# Patient Record
Sex: MALE | Race: WHITE | NOT HISPANIC OR LATINO | Employment: UNEMPLOYED | URBAN - METROPOLITAN AREA
[De-identification: names, ages, dates, MRNs, and addresses within clinical notes are randomized per-mention and may not be internally consistent; named-entity substitution may affect disease eponyms.]

---

## 2019-10-24 ENCOUNTER — HOSPITAL ENCOUNTER (EMERGENCY)
Facility: HOSPITAL | Age: 39
Discharge: HOME | End: 2019-10-24
Attending: EMERGENCY MEDICINE | Admitting: EMERGENCY MEDICINE
Payer: COMMERCIAL

## 2019-10-24 ENCOUNTER — APPOINTMENT (EMERGENCY)
Dept: CT IMAGING | Facility: HOSPITAL | Age: 39
End: 2019-10-24
Payer: COMMERCIAL

## 2019-10-24 VITALS
DIASTOLIC BLOOD PRESSURE: 69 MMHG | BODY MASS INDEX: 25.01 KG/M2 | HEART RATE: 67 BPM | HEIGHT: 68 IN | OXYGEN SATURATION: 98 % | SYSTOLIC BLOOD PRESSURE: 119 MMHG | RESPIRATION RATE: 16 BRPM | TEMPERATURE: 97.5 F | WEIGHT: 165 LBS

## 2019-10-24 DIAGNOSIS — R51.9 HEADACHE: Primary | ICD-10-CM

## 2019-10-24 DIAGNOSIS — G43.909 MIGRAINE: ICD-10-CM

## 2019-10-24 LAB
ALBUMIN SERPL BCP-MCNC: 4 G/DL (ref 3.5–5)
ALP SERPL-CCNC: 67 U/L (ref 46–116)
ALT SERPL W P-5'-P-CCNC: 32 U/L (ref 12–78)
ANION GAP SERPL CALCULATED.3IONS-SCNC: 10 MMOL/L (ref 4–13)
AST SERPL W P-5'-P-CCNC: 6 U/L (ref 5–45)
BASOPHILS # BLD AUTO: 0.02 THOUSANDS/ΜL (ref 0–0.1)
BASOPHILS NFR BLD AUTO: 0 % (ref 0–1)
BILIRUB SERPL-MCNC: 0.3 MG/DL (ref 0.2–1)
BUN SERPL-MCNC: 24 MG/DL (ref 5–25)
CALCIUM SERPL-MCNC: 8.7 MG/DL (ref 8.3–10.1)
CHLORIDE SERPL-SCNC: 108 MMOL/L (ref 100–108)
CO2 SERPL-SCNC: 23 MMOL/L (ref 21–32)
CREAT SERPL-MCNC: 0.87 MG/DL (ref 0.6–1.3)
EOSINOPHIL # BLD AUTO: 0.08 THOUSAND/ΜL (ref 0–0.61)
EOSINOPHIL NFR BLD AUTO: 1 % (ref 0–6)
ERYTHROCYTE [DISTWIDTH] IN BLOOD BY AUTOMATED COUNT: 14 % (ref 11.6–15.1)
GFR SERPL CREATININE-BSD FRML MDRD: 109 ML/MIN/1.73SQ M
GLUCOSE SERPL-MCNC: 99 MG/DL (ref 65–140)
HCT VFR BLD AUTO: 40.8 % (ref 36.5–49.3)
HGB BLD-MCNC: 13.9 G/DL (ref 12–17)
IMM GRANULOCYTES # BLD AUTO: 0.04 THOUSAND/UL (ref 0–0.2)
IMM GRANULOCYTES NFR BLD AUTO: 1 % (ref 0–2)
LYMPHOCYTES # BLD AUTO: 1.83 THOUSANDS/ΜL (ref 0.6–4.47)
LYMPHOCYTES NFR BLD AUTO: 27 % (ref 14–44)
MCH RBC QN AUTO: 29.8 PG (ref 26.8–34.3)
MCHC RBC AUTO-ENTMCNC: 34.1 G/DL (ref 31.4–37.4)
MCV RBC AUTO: 88 FL (ref 82–98)
MONOCYTES # BLD AUTO: 0.72 THOUSAND/ΜL (ref 0.17–1.22)
MONOCYTES NFR BLD AUTO: 10 % (ref 4–12)
NEUTROPHILS # BLD AUTO: 4.21 THOUSANDS/ΜL (ref 1.85–7.62)
NEUTS SEG NFR BLD AUTO: 61 % (ref 43–75)
NRBC BLD AUTO-RTO: 0 /100 WBCS
PLATELET # BLD AUTO: 148 THOUSANDS/UL (ref 149–390)
PMV BLD AUTO: 11.1 FL (ref 8.9–12.7)
POTASSIUM SERPL-SCNC: 3.7 MMOL/L (ref 3.5–5.3)
PROT SERPL-MCNC: 6.8 G/DL (ref 6.4–8.2)
RBC # BLD AUTO: 4.66 MILLION/UL (ref 3.88–5.62)
SODIUM SERPL-SCNC: 141 MMOL/L (ref 136–145)
WBC # BLD AUTO: 6.9 THOUSAND/UL (ref 4.31–10.16)

## 2019-10-24 PROCEDURE — 96375 TX/PRO/DX INJ NEW DRUG ADDON: CPT

## 2019-10-24 PROCEDURE — 80053 COMPREHEN METABOLIC PANEL: CPT | Performed by: EMERGENCY MEDICINE

## 2019-10-24 PROCEDURE — 96361 HYDRATE IV INFUSION ADD-ON: CPT

## 2019-10-24 PROCEDURE — 70450 CT HEAD/BRAIN W/O DYE: CPT

## 2019-10-24 PROCEDURE — 99284 EMERGENCY DEPT VISIT MOD MDM: CPT | Performed by: EMERGENCY MEDICINE

## 2019-10-24 PROCEDURE — 99284 EMERGENCY DEPT VISIT MOD MDM: CPT

## 2019-10-24 PROCEDURE — 96372 THER/PROPH/DIAG INJ SC/IM: CPT

## 2019-10-24 PROCEDURE — 36415 COLL VENOUS BLD VENIPUNCTURE: CPT | Performed by: EMERGENCY MEDICINE

## 2019-10-24 PROCEDURE — 96376 TX/PRO/DX INJ SAME DRUG ADON: CPT

## 2019-10-24 PROCEDURE — 85025 COMPLETE CBC W/AUTO DIFF WBC: CPT | Performed by: EMERGENCY MEDICINE

## 2019-10-24 PROCEDURE — 96374 THER/PROPH/DIAG INJ IV PUSH: CPT

## 2019-10-24 RX ORDER — TOPIRAMATE 25 MG/1
25 CAPSULE, COATED PELLETS ORAL 2 TIMES DAILY
COMMUNITY

## 2019-10-24 RX ORDER — KETOROLAC TROMETHAMINE 30 MG/ML
30 INJECTION, SOLUTION INTRAMUSCULAR; INTRAVENOUS ONCE
Status: COMPLETED | OUTPATIENT
Start: 2019-10-24 | End: 2019-10-24

## 2019-10-24 RX ORDER — GABAPENTIN 300 MG/1
300 CAPSULE ORAL 3 TIMES DAILY
COMMUNITY

## 2019-10-24 RX ORDER — DEXAMETHASONE SODIUM PHOSPHATE 10 MG/ML
10 INJECTION, SOLUTION INTRAMUSCULAR; INTRAVENOUS ONCE
Status: COMPLETED | OUTPATIENT
Start: 2019-10-24 | End: 2019-10-24

## 2019-10-24 RX ORDER — DIPHENHYDRAMINE HYDROCHLORIDE 50 MG/ML
25 INJECTION INTRAMUSCULAR; INTRAVENOUS ONCE
Status: COMPLETED | OUTPATIENT
Start: 2019-10-24 | End: 2019-10-24

## 2019-10-24 RX ORDER — METOCLOPRAMIDE HYDROCHLORIDE 5 MG/ML
10 INJECTION INTRAMUSCULAR; INTRAVENOUS ONCE
Status: COMPLETED | OUTPATIENT
Start: 2019-10-24 | End: 2019-10-24

## 2019-10-24 RX ORDER — AMILORIDE HCL 5 MG
10 TABLET ORAL EVERY 4 HOURS PRN
COMMUNITY

## 2019-10-24 RX ADMIN — KETOROLAC TROMETHAMINE 30 MG: 30 INJECTION, SOLUTION INTRAMUSCULAR at 13:11

## 2019-10-24 RX ADMIN — DIPHENHYDRAMINE HYDROCHLORIDE 25 MG: 50 INJECTION, SOLUTION INTRAMUSCULAR; INTRAVENOUS at 11:27

## 2019-10-24 RX ADMIN — DIPHENHYDRAMINE HYDROCHLORIDE 25 MG: 50 INJECTION, SOLUTION INTRAMUSCULAR; INTRAVENOUS at 13:09

## 2019-10-24 RX ADMIN — SODIUM CHLORIDE 1000 ML: 0.9 INJECTION, SOLUTION INTRAVENOUS at 11:29

## 2019-10-24 RX ADMIN — METOCLOPRAMIDE 10 MG: 5 INJECTION, SOLUTION INTRAMUSCULAR; INTRAVENOUS at 11:28

## 2019-10-24 RX ADMIN — KETOROLAC TROMETHAMINE 30 MG: 30 INJECTION, SOLUTION INTRAMUSCULAR at 11:26

## 2019-10-24 RX ADMIN — METOCLOPRAMIDE 10 MG: 5 INJECTION, SOLUTION INTRAMUSCULAR; INTRAVENOUS at 13:10

## 2019-10-24 RX ADMIN — DEXAMETHASONE SODIUM PHOSPHATE 10 MG: 10 INJECTION, SOLUTION INTRAMUSCULAR; INTRAVENOUS at 13:08

## 2019-10-24 NOTE — ED PROVIDER NOTES
History  Chief Complaint   Patient presents with    Headache     To ED with c/o left sided headache for several days  Has a hx of head trauma with frequent headaches  Has photophobia, no nausea and vomiting  This is a 40-year-old male who presents via ambulance from [facility] for evaluation of left-sided headache associated with photophobia nausea vomiting over the past several days he does have a prior history of skull fracture many years ago and gets headaches 3 times a week although this headache is more intense than his normal headache it was gradual in onset he denies any fevers no recent injury      History provided by:  Patient and EMS personnel  Headache   Pain location:  L temporal  Quality:  Sharp  Radiates to:  Does not radiate  Onset quality:  Gradual  Duration:  3 days  Timing:  Constant  Progression:  Unchanged  Chronicity:  Chronic  Similar to prior headaches: yes (More intense but similar)    Context: activity and bright light    Relieved by:  Nothing  Worsened by: Activity, light and sound  Associated symptoms: nausea and vomiting        Prior to Admission Medications   Prescriptions Last Dose Informant Patient Reported? Taking?   gabapentin (NEURONTIN) 300 mg capsule  Self Yes Yes   Sig: Take 300 mg by mouth 3 (three) times a day   nystatin (MYCOSTATIN) 500,000 units/5 mL suspension  Self Yes Yes   Sig: Apply 500,000 Units to the mouth or throat 4 (four) times a day   phenylephrine (SUDAFED PE) 10 MG TABS  Self Yes Yes   Sig: Take 10 mg by mouth every 4 (four) hours as needed for congestion   topiramate (TOPAMAX) 25 mg sprinkle capsule  Self Yes Yes   Sig: Take 25 mg by mouth 2 (two) times a day      Facility-Administered Medications: None       Past Medical History:   Diagnosis Date    Alcohol abuse     Drug abuse ([facility] [address] )     Head injury        History reviewed  No pertinent surgical history  History reviewed  No pertinent family history    I have reviewed and agree with the history as documented  Social History     Tobacco Use    Smoking status: Current Every Day Smoker     Packs/day: 1 00     Types: Cigarettes    Smokeless tobacco: Never Used   Substance Use Topics    Alcohol use: Not Currently     Comment: FORMER    Drug use: Not Currently        Review of Systems   Gastrointestinal: Positive for nausea and vomiting  Neurological: Positive for headaches  All other systems reviewed and are negative  Physical Exam  Physical Exam   Constitutional: He is oriented to person, place, and time  He appears well-developed and well-nourished  No distress  HENT:   Head: Normocephalic and atraumatic  Right Ear: External ear normal    Left Ear: External ear normal    Nose: Nose normal    Mouth/Throat: Oropharynx is clear and moist    Eyes: Pupils are equal, round, and reactive to light  Right eye exhibits no discharge  Left eye exhibits no discharge  No scleral icterus  Neck: Normal range of motion  Neck supple  No JVD present  No tracheal deviation present  Kernig and Brudzinski sign negative   Cardiovascular: Normal rate, regular rhythm and intact distal pulses  Exam reveals no gallop and no friction rub  No murmur heard  Pulmonary/Chest: Effort normal and breath sounds normal  No stridor  No respiratory distress  He has no wheezes  He has no rales  Abdominal: Soft  Bowel sounds are normal  He exhibits no distension  There is no tenderness  There is no guarding  Musculoskeletal: Normal range of motion  He exhibits no edema, tenderness or deformity  Neurological: He is alert and oriented to person, place, and time  No cranial nerve deficit  Skin: Skin is warm and dry  No rash noted  He is not diaphoretic  Psychiatric: He has a normal mood and affect  His behavior is normal  Thought content normal    Nursing note and vitals reviewed        Vital Signs  ED Triage Vitals   Temperature Pulse Respirations Blood Pressure SpO2   10/24/19 1100 10/24/19 1100 10/24/19 1100 10/24/19 1100 10/24/19 1100   97 5 °F (36 4 °C) 75 20 130/82 100 %      Temp Source Heart Rate Source Patient Position - Orthostatic VS BP Location FiO2 (%)   10/24/19 1100 10/24/19 1100 10/24/19 1100 10/24/19 1100 --   Tympanic Monitor Sitting Right arm       Pain Score       10/24/19 1056       9           Vitals:    10/24/19 1100   BP: 130/82   Pulse: 75   Patient Position - Orthostatic VS: Sitting         Visual Acuity  Visual Acuity      Most Recent Value   L Pupil Size (mm)  3   R Pupil Size (mm)  3          ED Medications  Medications   ketorolac (TORADOL) injection 30 mg (has no administration in time range)   metoclopramide (REGLAN) injection 10 mg (has no administration in time range)   diphenhydrAMINE (BENADRYL) injection 25 mg (has no administration in time range)   dexamethasone (PF) (DECADRON) injection 10 mg (has no administration in time range)   sodium chloride 0 9 % bolus 1,000 mL (1,000 mL Intravenous New Bag 10/24/19 1129)   ketorolac (TORADOL) injection 30 mg (30 mg Intravenous Given 10/24/19 1126)   metoclopramide (REGLAN) injection 10 mg (10 mg Intravenous Given 10/24/19 1128)   diphenhydrAMINE (BENADRYL) injection 25 mg (25 mg Intravenous Given 10/24/19 1127)       Diagnostic Studies  Results Reviewed     Procedure Component Value Units Date/Time    Comprehensive metabolic panel [[id number]] Collected:  10/24/19 1124    Lab Status:  Final result Specimen:  Blood from Arm, Left Updated:  10/24/19 1214     Sodium 141 mmol/L      Potassium 3 7 mmol/L      Chloride 108 mmol/L      CO2 23 mmol/L      ANION GAP 10 mmol/L      BUN 24 mg/dL      Creatinine 0 87 mg/dL      Glucose 99 mg/dL      Calcium 8 7 mg/dL      AST 6 U/L      ALT 32 U/L      Alkaline Phosphatase 67 U/L      Total Protein 6 8 g/dL      Albumin 4 0 g/dL      Total Bilirubin 0 30 mg/dL      eGFR 109 ml/min/1 73sq m     Narrative:       [place] guidelines for Chronic Kidney Disease (CKD):     Stage 1 with normal or high GFR (GFR > 90 mL/min/1 73 square meters)    Stage 2 Mild CKD (GFR = 60-89 mL/min/1 73 square meters)    Stage 3A Moderate CKD (GFR = 45-59 mL/min/1 73 square meters)    Stage 3B Moderate CKD (GFR = 30-44 mL/min/1 73 square meters)    Stage 4 Severe CKD (GFR = 15-29 mL/min/1 73 square meters)    Stage 5 End Stage CKD (GFR <15 mL/min/1 73 square meters)  Note: GFR calculation is accurate only with a steady state creatinine    CBC and differential [[id number]]  (Abnormal) Collected:  10/24/19 1124    Lab Status:  Final result Specimen:  Blood from Arm, Left Updated:  10/24/19 1157     WBC 6 90 Thousand/uL      RBC 4 66 Million/uL      Hemoglobin 13 9 g/dL      Hematocrit 40 8 %      MCV 88 fL      MCH 29 8 pg      MCHC 34 1 g/dL      RDW 14 0 %      MPV 11 1 fL      Platelets 798 Thousands/uL      nRBC 0 /100 WBCs      Neutrophils Relative 61 %      Immat GRANS % 1 %      Lymphocytes Relative 27 %      Monocytes Relative 10 %      Eosinophils Relative 1 %      Basophils Relative 0 %      Neutrophils Absolute 4 21 Thousands/µL      Immature Grans Absolute 0 04 Thousand/uL      Lymphocytes Absolute 1 83 Thousands/µL      Monocytes Absolute 0 72 Thousand/µL      Eosinophils Absolute 0 08 Thousand/µL      Basophils Absolute 0 02 Thousands/µL                  CT head without contrast   Final Result by Luis Lancaster MD (10/24 7617)      No acute intracranial abnormality                    Workstation performed: [id number]                    Procedures  Procedures       ED Course  ED Course as of Oct 24 1300   Thu Oct 24, 2019   1257 Feeling a little bit better resting comfortably in no acute distress will give in additional round of medication and discharge                                  MDM  Number of Diagnoses or Management Options  Diagnosis management comments: Left-sided headache most consistent with migraine will treat symptomatically check labs and CT scan       Amount and/or Complexity of Data Reviewed  Clinical lab tests: ordered  Tests in the radiology section of CPT®: ordered        Disposition  Final diagnoses:   Headache   Migraine     Time reflects when diagnosis was documented in both MDM as applicable and the Disposition within this note     Time User Action Codes Description Comment    10/24/2019  1:00 PM Idris Ruby [R51] Headache     10/24/2019  1:00 PM Idris Ruby [[id number]] Migraine       ED Disposition     ED Disposition Condition Date/Time Comment    Discharge Stable Thu Oct 24, 2019 12:59 PM Tim Woodward discharge to home/self care  Follow-up Information     Follow up With Specialties Details Why Contact Info Additional Information    Infolink  In 1 week Primary care physician for follow-up [facility] Emergency Department Emergency Medicine  As needed, If symptoms worsen [address] [place] [address] [place] [facility], [facility] [place], [place], [address]          Patient's Medications   Discharge Prescriptions    No medications on file     No discharge procedures on file      ED Provider  Electronically Signed by           Riccardo James DO  10/24/19 1300

## 2019-11-02 ENCOUNTER — HOSPITAL ENCOUNTER (EMERGENCY)
Facility: HOSPITAL | Age: 39
Discharge: HOME | End: 2019-11-02
Attending: EMERGENCY MEDICINE | Admitting: EMERGENCY MEDICINE
Payer: MEDICARE

## 2019-11-02 VITALS
HEIGHT: 68 IN | TEMPERATURE: 97.2 F | BODY MASS INDEX: 25.13 KG/M2 | OXYGEN SATURATION: 100 % | WEIGHT: 165.79 LBS | RESPIRATION RATE: 17 BRPM | HEART RATE: 62 BPM | SYSTOLIC BLOOD PRESSURE: 138 MMHG | DIASTOLIC BLOOD PRESSURE: 79 MMHG

## 2019-11-02 DIAGNOSIS — R51.9 HEADACHE: Primary | ICD-10-CM

## 2019-11-02 PROCEDURE — 96365 THER/PROPH/DIAG IV INF INIT: CPT

## 2019-11-02 PROCEDURE — 99283 EMERGENCY DEPT VISIT LOW MDM: CPT

## 2019-11-02 PROCEDURE — 99284 EMERGENCY DEPT VISIT MOD MDM: CPT | Performed by: EMERGENCY MEDICINE

## 2019-11-02 PROCEDURE — 96375 TX/PRO/DX INJ NEW DRUG ADDON: CPT

## 2019-11-02 RX ORDER — METOCLOPRAMIDE HYDROCHLORIDE 5 MG/ML
10 INJECTION INTRAMUSCULAR; INTRAVENOUS ONCE
Status: COMPLETED | OUTPATIENT
Start: 2019-11-02 | End: 2019-11-02

## 2019-11-02 RX ORDER — DIPHENHYDRAMINE HYDROCHLORIDE 50 MG/ML
25 INJECTION INTRAMUSCULAR; INTRAVENOUS ONCE
Status: COMPLETED | OUTPATIENT
Start: 2019-11-02 | End: 2019-11-02

## 2019-11-02 RX ORDER — MAGNESIUM SULFATE 1 G/100ML
1 INJECTION INTRAVENOUS ONCE
Status: COMPLETED | OUTPATIENT
Start: 2019-11-02 | End: 2019-11-02

## 2019-11-02 RX ORDER — KETOROLAC TROMETHAMINE 30 MG/ML
15 INJECTION, SOLUTION INTRAMUSCULAR; INTRAVENOUS ONCE
Status: COMPLETED | OUTPATIENT
Start: 2019-11-02 | End: 2019-11-02

## 2019-11-02 RX ORDER — DEXAMETHASONE SODIUM PHOSPHATE 10 MG/ML
10 INJECTION, SOLUTION INTRAMUSCULAR; INTRAVENOUS ONCE
Status: COMPLETED | OUTPATIENT
Start: 2019-11-02 | End: 2019-11-02

## 2019-11-02 RX ADMIN — METOCLOPRAMIDE 10 MG: 5 INJECTION, SOLUTION INTRAMUSCULAR; INTRAVENOUS at 16:19

## 2019-11-02 RX ADMIN — MAGNESIUM SULFATE HEPTAHYDRATE 1 G: 1 INJECTION, SOLUTION INTRAVENOUS at 16:19

## 2019-11-02 RX ADMIN — DEXAMETHASONE SODIUM PHOSPHATE 10 MG: 10 INJECTION, SOLUTION INTRAMUSCULAR; INTRAVENOUS at 16:19

## 2019-11-02 RX ADMIN — DIPHENHYDRAMINE HYDROCHLORIDE 25 MG: 50 INJECTION, SOLUTION INTRAMUSCULAR; INTRAVENOUS at 16:19

## 2019-11-02 RX ADMIN — KETOROLAC TROMETHAMINE 15 MG: 30 INJECTION, SOLUTION INTRAMUSCULAR at 16:18

## 2019-11-06 NOTE — ED PROVIDER NOTES
History  Chief Complaint   Patient presents with    Headache     Pt states that he has been having a headache since he was 21 when he fractured his skull  He has been at [facility] for the past month  He was self medicating before going to [facility] with ETOH and Kratum  HPI     72-year-old male presents for headache  The patient reports that he has had a headache for the past 18 years  He is currently appear event for the past month detoxing from alcohol and Kratom  States it all started when he had a skull fracture when he was 21  He reports a headache off and on since then  This headache has been going on for the past few days  Was recently seen for similar headache  No fevers chills visual changes or red flags  Normal neurologic exam otherwise  Assessment plan:  Headache likely tension versus migraine versus cluster  He does have some pain behind his eye  There is a normal funduscopic exam   Start with oxygen, migraine cocktail discharge follow up with Neurology    Prior to Admission Medications   Prescriptions Last Dose Informant Patient Reported? Taking?   gabapentin (NEURONTIN) 300 mg capsule  Self Yes No   Sig: Take 300 mg by mouth 3 (three) times a day   nystatin (MYCOSTATIN) 500,000 units/5 mL suspension  Self Yes No   Sig: Apply 500,000 Units to the mouth or throat 4 (four) times a day   phenylephrine (SUDAFED PE) 10 MG TABS  Self Yes No   Sig: Take 10 mg by mouth every 4 (four) hours as needed for congestion   topiramate (TOPAMAX) 25 mg sprinkle capsule  Self Yes No   Sig: Take 25 mg by mouth 2 (two) times a day      Facility-Administered Medications: None       Past Medical History:   Diagnosis Date    Alcohol abuse     Drug abuse ([facility] [address] )     Head injury     Psychiatric disorder     bipolar       History reviewed  No pertinent surgical history  History reviewed  No pertinent family history  I have reviewed and agree with the history as documented      Social History     Tobacco Use    Smoking status: Current Every Day Smoker     Packs/day: 1 00     Types: Cigarettes    Smokeless tobacco: Never Used   Substance Use Topics    Alcohol use: Not Currently     Comment: FORMER    Drug use: Not Currently        Review of Systems   Constitutional: Negative for chills, fatigue and fever  Eyes: Negative for photophobia and visual disturbance  Respiratory: Negative for cough and shortness of breath  Cardiovascular: Negative for chest pain, palpitations and leg swelling  Gastrointestinal: Negative for diarrhea, nausea and vomiting  Endocrine: Negative for polydipsia and polyuria  Genitourinary: Negative for decreased urine volume, difficulty urinating, dysuria and frequency  Musculoskeletal: Negative for back pain, neck pain and neck stiffness  Skin: Negative for color change and rash  Allergic/Immunologic: Negative for environmental allergies and immunocompromised state  Neurological: Negative for dizziness and headaches  Hematological: Negative for adenopathy  Does not bruise/bleed easily  Psychiatric/Behavioral: Negative for dysphoric mood  The patient is not nervous/anxious  Physical Exam  Physical Exam   Constitutional: He is oriented to person, place, and time  He appears well-developed  HENT:   Head: Normocephalic and atraumatic  Right Ear: External ear normal    Left Ear: External ear normal    Mouth/Throat: Oropharynx is clear and moist    Eyes: Pupils are equal, round, and reactive to light  Conjunctivae and EOM are normal    Neck: Normal range of motion  Neck supple  No JVD present  No thyromegaly present  Cardiovascular: Normal rate, regular rhythm and normal heart sounds  Exam reveals no gallop and no friction rub  No murmur heard  Pulmonary/Chest: Effort normal and breath sounds normal  No respiratory distress  He has no wheezes  He has no rales  Abdominal: Soft  Bowel sounds are normal  He exhibits no distension  There is no rebound and no guarding  Musculoskeletal: Normal range of motion  He exhibits no edema  Lymphadenopathy:     He has no cervical adenopathy  Neurological: He is alert and oriented to person, place, and time  No cranial nerve deficit  Skin: Skin is warm  Psychiatric: He has a normal mood and affect  His behavior is normal    Nursing note and vitals reviewed        Vital Signs  ED Triage Vitals [11/02/19 1602]   Temperature Pulse Respirations Blood Pressure SpO2   (!) 97 2 °F (36 2 °C) 84 18 142/93 99 %      Temp Source Heart Rate Source Patient Position - Orthostatic VS BP Location FiO2 (%)   Temporal -- Sitting Right arm --      Pain Score       8           Vitals:    11/02/19 1700 11/02/19 1715 11/02/19 1730 11/02/19 1745   BP: 125/76 117/65 121/71 138/79   Pulse: 64 65 66 62   Patient Position - Orthostatic VS:             Visual Acuity      ED Medications  Medications   ketorolac (TORADOL) injection 15 mg (15 mg Intravenous Given 11/2/19 1618)   dexamethasone (PF) (DECADRON) injection 10 mg (10 mg Intravenous Given 11/2/19 1619)   magnesium sulfate IVPB (premix) SOLN 1 g (0 g Intravenous Stopped 11/2/19 1649)   metoclopramide (REGLAN) injection 10 mg (10 mg Intravenous Given 11/2/19 1619)   diphenhydrAMINE (BENADRYL) injection 25 mg (25 mg Intravenous Given 11/2/19 1619)       Diagnostic Studies  Results Reviewed     None                 No orders to display              Procedures  Procedures       ED Course                               MDM  Number of Diagnoses or Management Options  Headache: new and requires workup      Disposition  Final diagnoses:   Headache     Time reflects when diagnosis was documented in both MDM as applicable and the Disposition within this note     Time User Action Codes Description Comment    11/2/2019  6:22 PM Eddy Johnson [address] [place] [address] Headache       ED Disposition     ED Disposition Condition Date/Time Comment    Discharge Stable Sat Nov 2, 2019  6:22 PM Godwin Diaz discharge to home/self care             Follow-up Information     Follow up With Specialties Details Why Jovany Camarillo MD Neurology   [address]  [phone]            Discharge Medication List as of 11/2/2019  6:28 PM      CONTINUE these medications which have NOT CHANGED    Details   gabapentin (NEURONTIN) 300 mg capsule Take 300 mg by mouth 3 (three) times a day, Historical Med      nystatin (MYCOSTATIN) 500,000 units/5 mL suspension Apply 500,000 Units to the mouth or throat 4 (four) times a day, Historical Med      phenylephrine (SUDAFED PE) 10 MG TABS Take 10 mg by mouth every 4 (four) hours as needed for congestion, Historical Med      topiramate (TOPAMAX) 25 mg sprinkle capsule Take 25 mg by mouth 2 (two) times a day, Historical Med           No discharge procedures on file      ED Provider  Electronically Signed by           Ermias Jones DO  11/06/19 8505 no

## 2020-02-10 ENCOUNTER — HOSPITAL ENCOUNTER (EMERGENCY)
Facility: HOSPITAL | Age: 40
Discharge: HOME | End: 2020-02-11
Attending: EMERGENCY MEDICINE | Admitting: EMERGENCY MEDICINE
Payer: MEDICARE

## 2020-02-10 DIAGNOSIS — F19.10 SUBSTANCE ABUSE (HCC): ICD-10-CM

## 2020-02-10 DIAGNOSIS — R45.851 SUICIDAL IDEATION: Primary | ICD-10-CM

## 2020-02-10 LAB
ALBUMIN SERPL BCP-MCNC: 3.7 G/DL (ref 3.5–5)
ALP SERPL-CCNC: 60 U/L (ref 46–116)
ALT SERPL W P-5'-P-CCNC: 18 U/L (ref 12–78)
AMPHETAMINES SERPL QL SCN: NEGATIVE
ANION GAP SERPL CALCULATED.3IONS-SCNC: 4 MMOL/L (ref 4–13)
AST SERPL W P-5'-P-CCNC: 7 U/L (ref 5–45)
BARBITURATES UR QL: NEGATIVE
BASOPHILS # BLD AUTO: 0.02 THOUSANDS/ΜL (ref 0–0.1)
BASOPHILS NFR BLD AUTO: 0 % (ref 0–1)
BENZODIAZ UR QL: NEGATIVE
BILIRUB SERPL-MCNC: 0.23 MG/DL (ref 0.2–1)
BUN SERPL-MCNC: 17 MG/DL (ref 5–25)
CALCIUM SERPL-MCNC: 8.6 MG/DL (ref 8.3–10.1)
CHLORIDE SERPL-SCNC: 108 MMOL/L (ref 100–108)
CO2 SERPL-SCNC: 29 MMOL/L (ref 21–32)
COCAINE UR QL: POSITIVE
CREAT SERPL-MCNC: 0.98 MG/DL (ref 0.6–1.3)
EOSINOPHIL # BLD AUTO: 0.19 THOUSAND/ΜL (ref 0–0.61)
EOSINOPHIL NFR BLD AUTO: 2 % (ref 0–6)
ERYTHROCYTE [DISTWIDTH] IN BLOOD BY AUTOMATED COUNT: 12.6 % (ref 11.6–15.1)
ETHANOL EXG-MCNC: 0 MG/DL
GFR SERPL CREATININE-BSD FRML MDRD: 97 ML/MIN/1.73SQ M
GLUCOSE SERPL-MCNC: 114 MG/DL (ref 65–140)
HCT VFR BLD AUTO: 39.9 % (ref 36.5–49.3)
HGB BLD-MCNC: 13.6 G/DL (ref 12–17)
IMM GRANULOCYTES # BLD AUTO: 0.01 THOUSAND/UL (ref 0–0.2)
IMM GRANULOCYTES NFR BLD AUTO: 0 % (ref 0–2)
LYMPHOCYTES # BLD AUTO: 2.74 THOUSANDS/ΜL (ref 0.6–4.47)
LYMPHOCYTES NFR BLD AUTO: 30 % (ref 14–44)
MCH RBC QN AUTO: 30.2 PG (ref 26.8–34.3)
MCHC RBC AUTO-ENTMCNC: 34.1 G/DL (ref 31.4–37.4)
MCV RBC AUTO: 89 FL (ref 82–98)
METHADONE UR QL: NEGATIVE
MONOCYTES # BLD AUTO: 0.67 THOUSAND/ΜL (ref 0.17–1.22)
MONOCYTES NFR BLD AUTO: 7 % (ref 4–12)
NEUTROPHILS # BLD AUTO: 5.43 THOUSANDS/ΜL (ref 1.85–7.62)
NEUTS SEG NFR BLD AUTO: 61 % (ref 43–75)
NRBC BLD AUTO-RTO: 0 /100 WBCS
OPIATES UR QL SCN: NEGATIVE
PCP UR QL: NEGATIVE
PLATELET # BLD AUTO: 203 THOUSANDS/UL (ref 149–390)
PMV BLD AUTO: 11.5 FL (ref 8.9–12.7)
POTASSIUM SERPL-SCNC: 4.1 MMOL/L (ref 3.5–5.3)
PROT SERPL-MCNC: 6.6 G/DL (ref 6.4–8.2)
RBC # BLD AUTO: 4.5 MILLION/UL (ref 3.88–5.62)
SODIUM SERPL-SCNC: 141 MMOL/L (ref 136–145)
THC UR QL: NEGATIVE
WBC # BLD AUTO: 9.06 THOUSAND/UL (ref 4.31–10.16)

## 2020-02-10 PROCEDURE — 80307 DRUG TEST PRSMV CHEM ANLYZR: CPT | Performed by: EMERGENCY MEDICINE

## 2020-02-10 PROCEDURE — 85025 COMPLETE CBC W/AUTO DIFF WBC: CPT | Performed by: EMERGENCY MEDICINE

## 2020-02-10 PROCEDURE — 99284 EMERGENCY DEPT VISIT MOD MDM: CPT

## 2020-02-10 PROCEDURE — 82075 ASSAY OF BREATH ETHANOL: CPT | Performed by: EMERGENCY MEDICINE

## 2020-02-10 PROCEDURE — 99285 EMERGENCY DEPT VISIT HI MDM: CPT | Performed by: EMERGENCY MEDICINE

## 2020-02-10 PROCEDURE — 80053 COMPREHEN METABOLIC PANEL: CPT | Performed by: EMERGENCY MEDICINE

## 2020-02-10 PROCEDURE — 36415 COLL VENOUS BLD VENIPUNCTURE: CPT | Performed by: EMERGENCY MEDICINE

## 2020-02-10 NOTE — ED NOTES
Dual bed search to following facilities:    [facility]: No beds available  [facility]: Spoke with Adalid Jin, no beds available tonight- but able to look for tomorrow if needed  [facility]: No answer in admissions department x2  [facility]: Spoke with Diana Hung, possible bed if another referral doesn't work out; chart faxed for review  [facility]: Spoke with A[facility], no beds available  [facility]: Spoke with [facility], possible bed availability; chart faxed for review       CORNELIO Beatty  02/10/20   2577

## 2020-02-10 NOTE — ED NOTES
Insurance Authorization for admission:   Phone call placed to [facility]  Phone number: [phone]  Spoke to Arturo PORTILLO      2 days approved  Level of care: Acute Inpt MH (201)  Review on TBD  Authorization # To be obtained by accepting facility upon arrival         EVS (Eligibility Verification System) called - [phone]  Automated system indicates: Active with [facility] for Transportation: To be completed, if needed, once transportation arranged       CORNELIO Thakur  02/10/20   1562

## 2020-02-10 NOTE — LETTER
[facility] EMERGENCY DEPARTMENT  [address] [place] [address]  Dept: [phone]      [facility] TRANSFER CONSENT    NAME Karena SPENCER 1980                              MRN [id number]    I have been informed of my rights regarding examination, treatment, and transfer   by Dr Steffany Gann MD    Benefits: Specialized equipment and/or services available at the receiving facility (Include comment)________________________(inpatient psychiatric stabilization)    Risks:        { ED EMTALA TRANSFER CHOICES:[id number]}    I authorize the performance of emergency medical procedures and treatments upon me in both transit and upon arrival at the receiving facility  Additionally, I authorize the release of any and all medical records to the receiving facility and request they be transported with me, if possible  I understand that the safest mode of transportation during a medical emergency is an ambulance and that the Hospital advocates the use of this mode of transport  Risks of traveling to the receiving facility by car, including absence of medical control, life sustaining equipment, such as oxygen, and medical personnel has been explained to me and I fully understand them  (NASIR CORRECT BOX BELOW)  [  ]  I consent to the stated transfer and to be transported by ambulance/helicopter  [  ]  I consent to the stated transfer, but refuse transportation by ambulance and accept full responsibility for my transportation by car    I understand the risks of non-ambulance transfers and I exonerate the Hospital and its staff from any deterioration in my condition that results from this refusal     X___________________________________________    DATE  20  TIME________  Signature of patient or legally responsible individual signing on patient behalf           RELATIONSHIP TO PATIENT_________________________          Provider Certification    NAME Tim Woodward                                         1980                              MRN [id number]    A medical screening exam was performed on the above named patient  Based on the examination:    Condition Necessitating Transfer The primary encounter diagnosis was Suicidal ideation  A diagnosis of Substance abuse ([facility] [address] ) was also pertinent to this visit  Patient Condition:      Reason for Transfer: Level of Care needed not available at this facility(inpatient psychiatric stabilization)    Transfer Requirements: [facility]   · Space available and qualified personnel available for treatment as acknowledged by Magdy Smith [phone]  · Agreed to accept transfer and to provide appropriate medical treatment as acknowledged by       Dr Zaira Quiros   · Appropriate medical records of the examination and treatment of the patient are provided at the time of transfer   [facility] [place] [address] _______  · Transfer will be performed by qualified personnel from [facility] with CTS  and appropriate transfer equipment as required, including the use of necessary and appropriate life support measures      Provider Certification: I have examined the patient and explained the following risks and benefits of being transferred/refusing transfer to the patient/family:  The patient is stable for psychiatric transfer because they are medically stable, and is protected from harming him/herself or others during transport      Based on these reasonable risks and benefits to the patient and/or the unborn child(stefan), and based upon the information available at the time of the patients examination, I certify that the medical benefits reasonably to be expected from the provision of appropriate medical treatments at another medical facility outweigh the increasing risks, if any, to the individuals medical condition, and in the case of labor to the unborn child, from effecting the transfer      X____________________________________________ DATE 02/11/20        TIME_______      ORIGINAL - SEND TO MEDICAL RECORDS   COPY - SEND WITH PATIENT DURING TRANSFER

## 2020-02-10 NOTE — ED NOTES
Pt is a 44 y o  male who was brought to the ED due to suicidal ideations and worsening depression  Patient states that since yesterday he has been having suicidal ideations with a plan to hang himself  Patient denies prior suicide attempts or prior suicidal ideations  Patient states that over the past week he has lost his job and his house  He has been staying in a hotel and relates that between that and cocaine use, he has used all his money  Patient states that his biggest trigger has been not having Suboxone for 4 days  Patient relates that he was prescribed suboxone while living in [place], however since moving out here he has been getting it off the street  Patient reports moving to the area around 7-8 months ago because his child lives around here  Patient denies supports outside of a friend in [place]  Patient states that he was in [facility] until September 2019 and as part of his probation he went to rehab at [facility] around December 2019  Patient denies any inpatient/outpatient treatment since  He relates that because he did not have opioids in his system, he was not prescribed suboxone and was never linked with an outpatient provider to begin to receive it again  Patient denies any prior mental health treatment, but relates that while he was in rehab, he was diagnosed with bipolar disorder  He expressed being prescribed medications, but when his depression became worse they were stopped and he has not had medications since  Patient denies homicidal ideations and auditory/visual hallucinations  Patient reports restless sleep and decreased appetite over the past week  Treatment options were discussed with patient who remains preoccupied with being prescribed suboxone  Patient states that the only reason he is more depressed is because he doesn't have his suboxone  After discussing further with patient, he is agreeable to inpatient dual treatment and signed a 201      Chief Complaint   Patient presents with    Psychiatric Evaluation     Pt reports increasing depression and Suicide attempt by hanging yesterday   Denies HI, AH, or VH     Intake Assessment completed, Safety risk Assessment completed    CORNELIO Harris  02/10/20   9929

## 2020-02-10 NOTE — ED NOTES
Pt belongings include back pack (next to sink under shelf D), cell phone, headphones, pants, shirt, jacket, sock, boots   Placed in [facility] utility room shelf D      Jasmyne Watts  02/10/20 0078

## 2020-02-11 VITALS
DIASTOLIC BLOOD PRESSURE: 63 MMHG | HEART RATE: 62 BPM | TEMPERATURE: 99 F | RESPIRATION RATE: 18 BRPM | WEIGHT: 160 LBS | SYSTOLIC BLOOD PRESSURE: 101 MMHG | OXYGEN SATURATION: 97 % | BODY MASS INDEX: 24.33 KG/M2

## 2020-02-11 NOTE — ED PROVIDER NOTES
History  Chief Complaint   Patient presents with    Psychiatric Evaluation     Pt reports increasing depression and Suicide attempt by hanging yesterday  Denies HI, AH, or VH     Patient 43-year-old past history of opioid use previously on Suboxone, cocaine use presenting today endorsing SI  No HI, AH, VH  He denies any history of psychiatric placement related to depression  Patient says that he wants to hang himself last night  He feels he is really depressed because he lost his house and he lost his job  Patient recently moved from [place] to the area  He says he has not established psychiatric care appear  He says he was on Suboxone down there  Patient denies any chest pain, shortness of breath, nausea, vomiting, diarrhea  Patient does not have any medical complaints  History provided by:  Patient   used: No    Psychiatric Evaluation   Presenting symptoms: suicidal thoughts    Associated symptoms: no abdominal pain and no chest pain        Prior to Admission Medications   Prescriptions Last Dose Informant Patient Reported? Taking?   gabapentin (NEURONTIN) 300 mg capsule  Self Yes No   Sig: Take 300 mg by mouth 3 (three) times a day   nystatin (MYCOSTATIN) 500,000 units/5 mL suspension  Self Yes No   Sig: Apply 500,000 Units to the mouth or throat 4 (four) times a day   phenylephrine (SUDAFED PE) 10 MG TABS  Self Yes No   Sig: Take 10 mg by mouth every 4 (four) hours as needed for congestion   topiramate (TOPAMAX) 25 mg sprinkle capsule  Self Yes No   Sig: Take 25 mg by mouth 2 (two) times a day      Facility-Administered Medications: None       Past Medical History:   Diagnosis Date    Alcohol abuse     Drug abuse ([facility] [address] )     Head injury     Psychiatric disorder     bipolar       History reviewed  No pertinent surgical history  History reviewed  No pertinent family history  I have reviewed and agree with the history as documented      Social History     Tobacco Use  Smoking status: Current Every Day Smoker     Packs/day: 1 00     Types: Cigarettes    Smokeless tobacco: Never Used   Substance Use Topics    Alcohol use: Not Currently     Comment: FORMER    Drug use: Yes     Types: Cocaine     Comment: suboxone         Review of Systems   Constitutional: Negative for chills, diaphoresis and fever  HENT: Negative  Eyes: Negative  Negative for visual disturbance  Respiratory: Negative  Negative for shortness of breath  Cardiovascular: Negative  Negative for chest pain  Gastrointestinal: Negative  Negative for abdominal pain, nausea and vomiting  Endocrine: Negative  Genitourinary: Negative  Musculoskeletal: Negative  Negative for myalgias  Skin: Negative  Negative for rash  Allergic/Immunologic: Negative  Neurological: Negative  Negative for light-headedness and numbness  Hematological: Negative  Psychiatric/Behavioral: Positive for suicidal ideas  Negative for dysphoric mood  All other systems reviewed and are negative  Physical Exam  ED Triage Vitals [02/10/20 1645]   Temperature Pulse Respirations Blood Pressure SpO2   99 °F (37 2 °C) 77 16 146/79 100 %      Temp Source Heart Rate Source Patient Position - Orthostatic VS BP Location FiO2 (%)   Tympanic Monitor Sitting Left arm --      Pain Score       No Pain             Orthostatic Vital Signs  Vitals:    02/10/20 1645 02/10/20 2229 02/11/20 0213   BP: 146/79 98/57 101/63   Pulse: 77 68 62   Patient Position - Orthostatic VS: Sitting  Lying       Physical Exam   Constitutional: He is oriented to person, place, and time  He appears well-developed and well-nourished  HENT:   Head: Normocephalic and atraumatic  Mouth/Throat: Oropharynx is clear and moist    Eyes: Conjunctivae are normal    Neck: Normal range of motion  Neck supple  Cardiovascular: Normal rate and regular rhythm  Pulmonary/Chest: Effort normal and breath sounds normal    Abdominal: Soft   He exhibits no distension  There is no tenderness  Musculoskeletal: Normal range of motion  Neurological: He is alert and oriented to person, place, and time  Skin: Skin is warm and dry  Psychiatric:   SI with plan to hang self yesterday; no HI, no AH/VH  Not actively suicidal    Nursing note and vitals reviewed        ED Medications  Medications - No data to display    Diagnostic Studies  Results Reviewed     Procedure Component Value Units Date/Time    Comprehensive metabolic panel [[id number]] Collected:  02/10/20 1921    Lab Status:  Final result Specimen:  Blood from Arm, Left Updated:  02/10/20 2003     Sodium 141 mmol/L      Potassium 4 1 mmol/L      Chloride 108 mmol/L      CO2 29 mmol/L      ANION GAP 4 mmol/L      BUN 17 mg/dL      Creatinine 0 98 mg/dL      Glucose 114 mg/dL      Calcium 8 6 mg/dL      AST 7 U/L      ALT 18 U/L      Alkaline Phosphatase 60 U/L      Total Protein 6 6 g/dL      Albumin 3 7 g/dL      Total Bilirubin 0 23 mg/dL      eGFR 97 ml/min/1 73sq m     Narrative:       [place] guidelines for Chronic Kidney Disease (CKD):     Stage 1 with normal or high GFR (GFR > 90 mL/min/1 73 square meters)    Stage 2 Mild CKD (GFR = 60-89 mL/min/1 73 square meters)    Stage 3A Moderate CKD (GFR = 45-59 mL/min/1 73 square meters)    Stage 3B Moderate CKD (GFR = 30-44 mL/min/1 73 square meters)    Stage 4 Severe CKD (GFR = 15-29 mL/min/1 73 square meters)    Stage 5 End Stage CKD (GFR <15 mL/min/1 73 square meters)  Note: GFR calculation is accurate only with a steady state creatinine    CBC and differential [[id number]] Collected:  02/10/20 1921    Lab Status:  Final result Specimen:  Blood from Arm, Left Updated:  02/10/20 1931     WBC 9 06 Thousand/uL      RBC 4 50 Million/uL      Hemoglobin 13 6 g/dL      Hematocrit 39 9 %      MCV 89 fL      MCH 30 2 pg      MCHC 34 1 g/dL      RDW 12 6 %      MPV 11 5 fL      Platelets 294 Thousands/uL      nRBC 0 /100 WBCs Neutrophils Relative 61 %      Immat GRANS % 0 %      Lymphocytes Relative 30 %      Monocytes Relative 7 %      Eosinophils Relative 2 %      Basophils Relative 0 %      Neutrophils Absolute 5 43 Thousands/µL      Immature Grans Absolute 0 01 Thousand/uL      Lymphocytes Absolute 2 74 Thousands/µL      Monocytes Absolute 0 67 Thousand/µL      Eosinophils Absolute 0 19 Thousand/µL      Basophils Absolute 0 02 Thousands/µL     Rapid drug screen, urine [[id number]]  (Abnormal) Collected:  02/10/20 1719    Lab Status:  Final result Specimen:  Urine, Clean Catch Updated:  02/10/20 1824     Amph/Meth UR Negative     Barbiturate Ur Negative     Benzodiazepine Urine Negative     Cocaine Urine Positive     Methadone Urine Negative     Opiate Urine Negative     PCP Ur Negative     THC Urine Negative    Narrative:       Presumptive report  If requested, specimen will be sent to reference lab for confirmation  FOR MEDICAL PURPOSES ONLY  IF CONFIRMATION NEEDED PLEASE CONTACT THE LAB WITHIN 5 DAYS  Drug Screen Cutoff Levels:  AMPHETAMINE/METHAMPHETAMINES  1000 ng/mL  BARBITURATES     200 ng/mL  BENZODIAZEPINES     200 ng/mL  COCAINE      300 ng/mL  METHADONE      300 ng/mL  OPIATES      300 ng/mL  PHENCYCLIDINE     25 ng/mL  THC       50 ng/mL      POCT alcohol breath test [[id number]]  (Normal) Resulted:  02/10/20 1709    Lab Status:  Final result Updated:  02/10/20 1709     EXTBreath Alcohol 0 000                 No orders to display         Procedures  Procedures      ED Course                               MDM  Number of Diagnoses or Management Options  Substance abuse ([facility] [address] ): established and worsening  Suicidal ideation: new and does not require workup  Diagnosis management comments: 75-year-old presenting today for evaluation suicidal ideation  No medical complaints  Will check alcohol breath test, urine drug screen and get a crisis consult  Patient no longer actively suicidal   Patient would like to be discharged  He says that he is only here because he wanted shelter  Crisis evaluation believes he's not suicidal either  Patient given discharge instructions and return precautions  The patient and/or family vocalizes understanding  Answered all of the patient's and/or family's questions  Will follow up with PCP around here (given information)  Patient and/or family are agreeable to the plan  Amount and/or Complexity of Data Reviewed  Clinical lab tests: ordered and reviewed  Tests in the medicine section of CPT®: reviewed and ordered  Review and summarize past medical records: yes  Discuss the patient with other providers: yes  Independent visualization of images, tracings, or specimens: yes    Patient Progress  Patient progress: stable        Disposition  Final diagnoses:   Suicidal ideation   Substance abuse ([facility] [address] )     Time reflects when diagnosis was documented in both MDM as applicable and the Disposition within this note     Time User Action Codes Description Comment    2/11/2020  2:01 AM Syd, [id number] [facility] Suicidal ideation     2/11/2020  2:02 AM Jing Dunham Add [F19 10] Substance abuse [facility])       ED Disposition     ED Disposition Condition Date/Time Comment    Discharge Stable Tue Feb 11, 2020  4:48 AM Emily Hernandez discharge to home/self care              MD Documentation      Most Recent Value   Reason for Transfer  Level of Care needed not available at this facility [inpatient psychiatric stabilization]   Benefits of Transfer  Specialized equipment and/or services available at the receiving facility (Include comment)________________________ [inpatient psychiatric stabilization]   Accepting Physician  Dr Anastasia Luevano Name, [facility] [place]    (Name & Tel number)  Onel Grimm [phone]   Transported by (Company and Unit #)  Obey Willingham with [facility]   Sending MD Dr Turner Belle   Provider Certification  The patient is stable for psychiatric transfer because they are medically stable, and is protected from harming him/herself or others during transport      RN Documentation      [facility] [address] [facility] [address] Name, Bassem Corbett [address]    (Name & Tel number)  Jenifer Marie [phone]   Transported by (Company and Unit #)  [facility] with [facility]      Follow-up Information    None         Discharge Medication List as of 2/11/2020  4:52 AM      CONTINUE these medications which have NOT CHANGED    Details   gabapentin (NEURONTIN) 300 mg capsule Take 300 mg by mouth 3 (three) times a day, Historical Med      nystatin (MYCOSTATIN) 500,000 units/5 mL suspension Apply 500,000 Units to the mouth or throat 4 (four) times a day, Historical Med      phenylephrine (SUDAFED PE) 10 MG TABS Take 10 mg by mouth every 4 (four) hours as needed for congestion, Historical Med      topiramate (TOPAMAX) 25 mg sprinkle capsule Take 25 mg by mouth 2 (two) times a day, Historical Med           No discharge procedures on file  ED Provider  Attending physically available and evaluated Lila Nj I managed the patient along with the ED Attending      Electronically Signed by         Clementina Marion MD  02/11/20 7876

## 2020-02-11 NOTE — ED ATTENDING ATTESTATION
2/10/2020  IShiela MD, saw and evaluated the patient  I have discussed the patient with the resident and agree with the resident's findings, Plan of Care, and MDM as documented in the resident's note, unless otherwise documented below  All available labs and Radiology studies were reviewed by myself  I was present for key portions of any procedure(s) performed by the resident and I was immediately available to provide assistance  I agree with the current assessment done in the Emergency Department  I have conducted an independent evaluation of this patient  Briefly, this is a 44 y o  male presenting with worsening low moods and thoughts of suicide by hanging  Patient reports history of opioid abuse (prescription pills)  He reports having Suboxone prescribed to him in [place] over 8 months ago  Since running out of that prescription, patient has been trying to get Suboxone on the street  In the past several days, he reports using cocaine  He reports worsening low moods  Yesterday in the evening, he thought of hanging himself  He has not tried to do anything to hurt himself  He is presenting for further evaluation and help  No thoughts of HI  Denies prior psychiatric hospitalizations  Physical Exam  Vitals:    02/10/20 1645 02/10/20 2229   BP: 146/79 98/57   TempSrc: Tympanic    Pulse: 77 68   Resp: 16 18   Patient Position - Orthostatic VS: Sitting    Temp: 99 °F (37 2 °C)      Constitutional:  Awake, alert, oriented  No acute distress  HEENT:  Normocephalic, atraumatic  Sclera anicteric, conjunctiva not injected  Moist oral mucosa  Cardiac:  Regular rate and rhythm, no murmurs, rubs, or gallops  2+ radial pulses  Respiratory:  Lungs are clear to auscultation bilaterally, no wheezes or rales  Abdomen:  Nondistended  Bowel sounds present  No tenderness to palpation  No rebound or guarding  Extremities:  No deformities, no edema    Integument:  No rashes or exposed areas, cap refill less than 2 seconds  Neurologic:  Awake, alert, and oriented x3  Nonfocal exam   Psychiatric:  Normal affect, pleasant, reports low moods, reports thoughts of suicide with plan to hang himself, denies HI  Does not appear to attend to audiovisual hallucinations  Labs  Labs Reviewed   RAPID DRUG SCREEN, URINE - Abnormal       Result Value Ref Range Status    Amph/Meth UR Negative  Negative Final    Barbiturate Ur Negative  Negative Final    Benzodiazepine Urine Negative  Negative Final    Cocaine Urine Positive (*) Negative Final    Methadone Urine Negative  Negative Final    Opiate Urine Negative  Negative Final    PCP Ur Negative  Negative Final    THC Urine Negative  Negative Final    Narrative:     Presumptive report  If requested, specimen will be sent to reference lab for confirmation  FOR MEDICAL PURPOSES ONLY  IF CONFIRMATION NEEDED PLEASE CONTACT THE LAB WITHIN 5 DAYS  Drug Screen Cutoff Levels:  AMPHETAMINE/METHAMPHETAMINES  1000 ng/mL  BARBITURATES     200 ng/mL  BENZODIAZEPINES     200 ng/mL  COCAINE      300 ng/mL  METHADONE      300 ng/mL  OPIATES      300 ng/mL  PHENCYCLIDINE     25 ng/mL  THC       50 ng/mL     POCT ALCOHOL BREATH TEST - Normal    EXTBreath Alcohol 0 000   Final   COMPREHENSIVE METABOLIC PANEL    Sodium 701  136 - 145 mmol/L Final    Potassium 4 1  3 5 - 5 3 mmol/L Final    Chloride 108  100 - 108 mmol/L Final    CO2 29  21 - 32 mmol/L Final    ANION GAP 4  4 - 13 mmol/L Final    BUN 17  5 - 25 mg/dL Final    Creatinine 0 98  0 60 - 1 30 mg/dL Final    Comment: Standardized to IDMS reference method    Glucose 114  65 - 140 mg/dL Final    Comment:   If the patient is fasting, the ADA then defines impaired fasting glucose as > 100 mg/dL and diabetes as > or equal to 123 mg/dL  Specimen collection should occur prior to Sulfasalazine administration due to the potential for falsely depressed results   Specimen collection should occur prior to Sulfapyridine administration due to the potential for falsely elevated results  Calcium 8 6  8 3 - 10 1 mg/dL Final    AST 7  5 - 45 U/L Final    Comment:   Specimen collection should occur prior to Sulfasalazine administration due to the potential for falsely depressed results  ALT 18  12 - 78 U/L Final    Comment:   Specimen collection should occur prior to Sulfasalazine and/or Sulfapyridine administration due to the potential for falsely depressed results       Alkaline Phosphatase 60  46 - 116 U/L Final    Total Protein 6 6  6 4 - 8 2 g/dL Final    Albumin 3 7  3 5 - 5 0 g/dL Final    Total Bilirubin 0 23  0 20 - 1 00 mg/dL Final    eGFR 97  ml/min/1 73sq m Final    Narrative:     [place] guidelines for Chronic Kidney Disease (CKD):     Stage 1 with normal or high GFR (GFR > 90 mL/min/1 73 square meters)    Stage 2 Mild CKD (GFR = 60-89 mL/min/1 73 square meters)    Stage 3A Moderate CKD (GFR = 45-59 mL/min/1 73 square meters)    Stage 3B Moderate CKD (GFR = 30-44 mL/min/1 73 square meters)    Stage 4 Severe CKD (GFR = 15-29 mL/min/1 73 square meters)    Stage 5 End Stage CKD (GFR <15 mL/min/1 73 square meters)  Note: GFR calculation is accurate only with a steady state creatinine   CBC AND DIFFERENTIAL    WBC 9 06  4 31 - 10 16 Thousand/uL Final    RBC 4 50  3 88 - 5 62 Million/uL Final    Hemoglobin 13 6  12 0 - 17 0 g/dL Final    Hematocrit 39 9  36 5 - 49 3 % Final    MCV 89  82 - 98 fL Final    MCH 30 2  26 8 - 34 3 pg Final    MCHC 34 1  31 4 - 37 4 g/dL Final    RDW 12 6  11 6 - 15 1 % Final    MPV 11 5  8 9 - 12 7 fL Final    Platelets 090  748 - 390 Thousands/uL Final    nRBC 0  /100 WBCs Final    Neutrophils Relative 61  43 - 75 % Final    Immat GRANS % 0  0 - 2 % Final    Lymphocytes Relative 30  14 - 44 % Final    Monocytes Relative 7  4 - 12 % Final    Eosinophils Relative 2  0 - 6 % Final    Basophils Relative 0  0 - 1 % Final    Neutrophils Absolute 5 43  1 85 - 7 62 Thousands/µL Final    Immature Grans Absolute 0 01  0 00 - 0 20 Thousand/uL Final    Lymphocytes Absolute 2 74  0 60 - 4 47 Thousands/µL Final    Monocytes Absolute 0 67  0 17 - 1 22 Thousand/µL Final    Eosinophils Absolute 0 19  0 00 - 0 61 Thousand/µL Final    Basophils Absolute 0 02  0 00 - 0 10 Thousands/µL Final     ED Course    43-year-old male presenting with worsening low moods and thoughts of suicide  Vital signs reviewed, within normal limits  UDS positive for cocaine  CMP unremarkable  Breathalyzer alcohol 0  Patient signs 201 voluntary form  Patient is awaiting bed placement      Clinical Impression  Final diagnoses:   Suicidal ideation   Substance abuse ([facility] [address] )

## 2020-02-11 NOTE — ED NOTES
Crisis at bedside for pt to sign transfer paperwork, pt advised he is accepted at [facility]  Pt states "I am not going to [place]", pt refusing to sign paperwork  Pt states "I am not suicidal  I never said that  I came here because I don't have a house " Dr Carlson Reasons at bedside to discuss placement options with patient        Natalya Duke RN  02/11/20 [id number]

## 2020-02-11 NOTE — ED NOTES
Received a call from [facility] at [address] [facility], requesting CMP and CBC  Doctor made aware       Adis Jones, CORNELIO  02/10/20   0813

## 2020-02-11 NOTE — ED NOTES
Patient is accepted at [facility]  Patient is accepted by Dr Tuyet Mensah per [place] in admissions  Transportation is arranged with [facility] for 12pm 02/11/2020- unless earlier transportation is secured  No availability with [facility] for the morning; awaiting return call from [facility] for availability  Patient cannot arrive until after 9a  No nurse report is needed       CORNELIO Martinez  02/10/20   [id number]

## 2020-02-11 NOTE — ED NOTES
Rema Roy called back and they can only do a transport at 0230 currently and said we have to call back after 0700 if we want to see if there is any transport options earlier than at noon  Transport is currently scheduled at 1200 on 2/11/2020 with [facility]

## 2020-02-11 NOTE — ED NOTES
Patient refused to go to [facility] and stated he was never suicidal that he was only here because he was homeless and needed a place to stay  Dr Joen León reassessed patient and found him to not be a danger to himself or others and was ok with him being discharged home  He was provided with outpatient D&A, psychiatric providers and emergency shelter lists  [place] and transport notified of discharge

## 2020-02-23 ENCOUNTER — HOSPITAL ENCOUNTER (EMERGENCY)
Facility: HOSPITAL | Age: 40
Discharge: HOME | End: 2020-02-24
Attending: EMERGENCY MEDICINE | Admitting: EMERGENCY MEDICINE
Payer: MEDICARE

## 2020-02-23 DIAGNOSIS — R51.9 HEADACHE: Primary | ICD-10-CM

## 2020-02-23 PROCEDURE — 99283 EMERGENCY DEPT VISIT LOW MDM: CPT

## 2020-02-23 PROCEDURE — 99284 EMERGENCY DEPT VISIT MOD MDM: CPT | Performed by: EMERGENCY MEDICINE

## 2020-02-24 VITALS
RESPIRATION RATE: 20 BRPM | SYSTOLIC BLOOD PRESSURE: 102 MMHG | DIASTOLIC BLOOD PRESSURE: 55 MMHG | OXYGEN SATURATION: 98 % | TEMPERATURE: 97.8 F | HEART RATE: 70 BPM

## 2020-02-24 PROCEDURE — 96375 TX/PRO/DX INJ NEW DRUG ADDON: CPT

## 2020-02-24 PROCEDURE — 96365 THER/PROPH/DIAG IV INF INIT: CPT

## 2020-02-24 PROCEDURE — 96366 THER/PROPH/DIAG IV INF ADDON: CPT

## 2020-02-24 PROCEDURE — 96368 THER/DIAG CONCURRENT INF: CPT

## 2020-02-24 RX ORDER — METOCLOPRAMIDE HYDROCHLORIDE 5 MG/ML
10 INJECTION INTRAMUSCULAR; INTRAVENOUS ONCE
Status: COMPLETED | OUTPATIENT
Start: 2020-02-24 | End: 2020-02-24

## 2020-02-24 RX ORDER — DEXAMETHASONE SODIUM PHOSPHATE 10 MG/ML
10 INJECTION, SOLUTION INTRAMUSCULAR; INTRAVENOUS ONCE
Status: COMPLETED | OUTPATIENT
Start: 2020-02-24 | End: 2020-02-24

## 2020-02-24 RX ORDER — ACETAMINOPHEN 325 MG/1
975 TABLET ORAL ONCE
Status: COMPLETED | OUTPATIENT
Start: 2020-02-24 | End: 2020-02-24

## 2020-02-24 RX ORDER — KETOROLAC TROMETHAMINE 30 MG/ML
15 INJECTION, SOLUTION INTRAMUSCULAR; INTRAVENOUS ONCE
Status: COMPLETED | OUTPATIENT
Start: 2020-02-24 | End: 2020-02-24

## 2020-02-24 RX ORDER — SODIUM CHLORIDE, SODIUM GLUCONATE, SODIUM ACETATE, POTASSIUM CHLORIDE, MAGNESIUM CHLORIDE, SODIUM PHOSPHATE, DIBASIC, AND POTASSIUM PHOSPHATE .53; .5; .37; .037; .03; .012; .00082 G/100ML; G/100ML; G/100ML; G/100ML; G/100ML; G/100ML; G/100ML
1000 INJECTION, SOLUTION INTRAVENOUS ONCE
Status: COMPLETED | OUTPATIENT
Start: 2020-02-24 | End: 2020-02-24

## 2020-02-24 RX ORDER — MAGNESIUM SULFATE HEPTAHYDRATE 40 MG/ML
2 INJECTION, SOLUTION INTRAVENOUS ONCE
Status: COMPLETED | OUTPATIENT
Start: 2020-02-24 | End: 2020-02-24

## 2020-02-24 RX ADMIN — SODIUM CHLORIDE, SODIUM GLUCONATE, SODIUM ACETATE, POTASSIUM CHLORIDE, MAGNESIUM CHLORIDE, SODIUM PHOSPHATE, DIBASIC, AND POTASSIUM PHOSPHATE 1000 ML: .53; .5; .37; .037; .03; .012; .00082 INJECTION, SOLUTION INTRAVENOUS at 00:35

## 2020-02-24 RX ADMIN — DEXAMETHASONE SODIUM PHOSPHATE 10 MG: 10 INJECTION, SOLUTION INTRAMUSCULAR; INTRAVENOUS at 00:34

## 2020-02-24 RX ADMIN — METOCLOPRAMIDE 10 MG: 5 INJECTION, SOLUTION INTRAMUSCULAR; INTRAVENOUS at 00:34

## 2020-02-24 RX ADMIN — MAGNESIUM SULFATE HEPTAHYDRATE 2 G: 40 INJECTION, SOLUTION INTRAVENOUS at 00:58

## 2020-02-24 RX ADMIN — ACETAMINOPHEN 975 MG: 325 TABLET ORAL at 00:33

## 2020-02-24 RX ADMIN — KETOROLAC TROMETHAMINE 15 MG: 30 INJECTION, SOLUTION INTRAMUSCULAR at 00:34

## 2020-02-24 NOTE — ED PROVIDER NOTES
ASSESSMENT AND PLAN    Katelyn Webster is a 44 y o  male with a history of recurrent migraine headaches who presents for evaluation of gradual onset headache, over the last 2-3 days as described below  On arrival, the patient is hemodynamically stable, nontoxic, however appears quite uncomfortable secondary to pain  On exam , the patient's neurologic exam is unremarkable   The physical exam is otherwise unremarkable   -likely primary headache  -patient was given Tylenol, Toradol, Reglan, magnesium, Decadron for symptomatic relief   -on my re-evaluation, the patient is sleeping comfortably  After awakening him, he states that headache is slightly improved  He notes that due to his symptoms he has only slept 1 hour over the last 3 days  -will discharge home  Strict return precautions provided  Recommended he follow up with his primary care physician and neurologist regarding his recurrent headaches    History  Chief Complaint   Patient presents with    Headache - Recurrent or Known Dx Migraines     Pt reports migraine for past 3 days; pt reports nausea, photosensitivity; pt has hx of migraines, but states this is the worst migraine he has had     HPI this is a 79-year-old male who presents for evaluation of headache  The patient states headache started 2-3 days ago, has been gradual in onset, as gradually worsened  He states the pain is significantly worse today, so he came to the emergency department for further evaluation  The patient does note the is a history of traumatic brain injury, and since that traumatic brain injury, he has had recurrent migraines, but notes that his symptoms are more severe today than normal   He states otherwise the headache feels quite similar  The patient does endorse photophobia, and had a single episode of vomiting earlier today  He denies any visual changes, fevers, chills, chest pain, shortness of breath, abdominal pain, diarrhea, or urinary symptoms    He has no recent trauma to the head  He has no recent weight gain or weight loss  He has not yet tried any medications for his symptoms    Prior to Admission Medications   Prescriptions Last Dose Informant Patient Reported? Taking?   gabapentin (NEURONTIN) 300 mg capsule Not Taking at Unknown time Self Yes No   Sig: Take 300 mg by mouth 3 (three) times a day   nystatin (MYCOSTATIN) 500,000 units/5 mL suspension Not Taking at Unknown time Self Yes No   Sig: Apply 500,000 Units to the mouth or throat 4 (four) times a day   phenylephrine (SUDAFED PE) 10 MG TABS Not Taking at Unknown time Self Yes No   Sig: Take 10 mg by mouth every 4 (four) hours as needed for congestion   topiramate (TOPAMAX) 25 mg sprinkle capsule Not Taking at Unknown time Self Yes No   Sig: Take 25 mg by mouth 2 (two) times a day      Facility-Administered Medications: None       Past Medical History:   Diagnosis Date    Alcohol abuse     Drug abuse ([facility] [address] )     Head injury     Psychiatric disorder     bipolar       History reviewed  No pertinent surgical history  History reviewed  No pertinent family history  I have reviewed and agree with the history as documented  Social History     Tobacco Use    Smoking status: Current Every Day Smoker     Packs/day: 1 00     Types: Cigarettes    Smokeless tobacco: Never Used   Substance Use Topics    Alcohol use: Not Currently     Comment: FORMER    Drug use: Yes     Frequency: 3 0 times per week     Types: Cocaine     Comment: suboxone         Review of Systems   Constitutional: Negative for chills and fever  HENT: Negative for congestion and sinus pain  Eyes: Positive for photophobia  Negative for visual disturbance  Respiratory: Negative for cough and shortness of breath  Cardiovascular: Negative for chest pain and palpitations  Gastrointestinal: Negative for abdominal pain, diarrhea, nausea and vomiting  Genitourinary: Negative for dysuria and hematuria     Musculoskeletal: Negative for neck pain and neck stiffness  Skin: Negative for pallor and rash  Neurological: Positive for headaches  Negative for light-headedness  Physical Exam  ED Triage Vitals [02/23/20 2331]   Temperature Pulse Respirations Blood Pressure SpO2   97 8 °F (36 6 °C) 59 18 159/85 98 %      Temp Source Heart Rate Source Patient Position - Orthostatic VS BP Location FiO2 (%)   Oral Monitor Sitting Right arm --      Pain Score       Worst Possible Pain             Orthostatic Vital Signs  Vitals:    02/23/20 2331 02/24/20 0130   BP: 159/85 102/55   Pulse: 59 70   Patient Position - Orthostatic VS: Sitting        Physical Exam   Constitutional: He is oriented to person, place, and time  Awake and alert  Nontoxic  Uncomfortable appearing secondary to pain  mental status appropriate   HENT:   Head: Normocephalic  Mouth/Throat: Oropharynx is clear and moist  No oropharyngeal exudate  Eyes: Pupils are equal, round, and reactive to light  EOM are normal  No scleral icterus  Unable to assess funduscopic exam due to patient cooperation   Neck: Normal range of motion  No JVD present  Cardiovascular: Normal rate, regular rhythm and normal heart sounds  No murmur heard  Pulmonary/Chest: Effort normal  No stridor  No respiratory distress  He has no wheezes  He has no rales  Abdominal: Soft  He exhibits no distension  There is no tenderness  Musculoskeletal: Normal range of motion  He exhibits no edema, tenderness or deformity  Neurological: He is alert and oriented to person, place, and time  No cranial nerve deficit  He exhibits normal muscle tone  Strength is 5/5 and equal in all extremities bilaterally  Sensation grossly intact and equal in all extremities  No cranial nerve deficits appreciated  Skin: Skin is warm and dry  No pallor         ED Medications  Medications   acetaminophen (TYLENOL) tablet 975 mg (975 mg Oral Given 2/24/20 0033)   ketorolac (TORADOL) injection 15 mg (15 mg Intravenous Given 2/24/20 0034)   metoclopramide (REGLAN) injection 10 mg (10 mg Intravenous Given 2/24/20 0034)   dexamethasone (PF) (DECADRON) injection 10 mg (10 mg Intravenous Given 2/24/20 0034)   multi-electrolyte (ISOLYTE-S PH 7 4) bolus 1,000 mL (0 mL Intravenous Stopped 2/24/20 0216)   magnesium sulfate 2 g/50 mL IVPB (premix) 2 g (0 g Intravenous Stopped 2/24/20 0157)       Diagnostic Studies  Results Reviewed     None                 No orders to display         Procedures  Procedures      ED Course                               MDM      Disposition  Final diagnoses:   Headache     Time reflects when diagnosis was documented in both MDM as applicable and the Disposition within this note     Time User Action Codes Description Comment    2/24/2020  1:27 AM Loly Forte Add [R51] Headache       ED Disposition     ED Disposition Condition Date/Time Comment    Discharge Stable Mon Feb 24, 2020  1:27 AM Jen Hardwick discharge to home/self care  Follow-up Information     Follow up With Specialties Details Why Contact Info    Walter Goff DO [facility]   [address] [place] [address]  [phone]            Discharge Medication List as of 2/24/2020  1:27 AM      CONTINUE these medications which have NOT CHANGED    Details   gabapentin (NEURONTIN) 300 mg capsule Take 300 mg by mouth 3 (three) times a day, Historical Med      nystatin (MYCOSTATIN) 500,000 units/5 mL suspension Apply 500,000 Units to the mouth or throat 4 (four) times a day, Historical Med      phenylephrine (SUDAFED PE) 10 MG TABS Take 10 mg by mouth every 4 (four) hours as needed for congestion, Historical Med      topiramate (TOPAMAX) 25 mg sprinkle capsule Take 25 mg by mouth 2 (two) times a day, Historical Med           No discharge procedures on file  ED Provider  Attending physically available and evaluated Jen Hardwick I managed the patient along with the ED Attending      Electronically Signed by         Arlene Arnold MD  02/24/20 7041

## 2020-02-24 NOTE — ED ATTENDING ATTESTATION
2/23/2020  Skye Weems DO, saw and evaluated the patient  I have discussed the patient with the resident/non-physician practitioner and agree with the resident's/non-physician practitioner's findings, Plan of Care, and MDM as documented in the resident's/non-physician practitioner's note, except where noted  All available labs and Radiology studies were reviewed  I was present for key portions of any procedure(s) performed by the resident/non-physician practitioner and I was immediately available to provide assistance  At this point I agree with the current assessment done in the Emergency Department  I have conducted an independent evaluation of this patient a history and physical is as follows:    45 yo male presents for evaluation of generalized HA progressive onset, moderate severity  Worse with light exposure  Similar to prior HA but bilateral and more intense  Denies change in vision, speech, neck pain/stiffness, focal weakness/numbness/tingling  Symptoms constant  Hx of TBI, migraines  EOMI, PERRL  Normal gait  Neck supple no meningismus    Imp: HA likely migraine plan: tx sx, reassess        ED Course         Critical Care Time  Procedures